# Patient Record
Sex: MALE | Race: WHITE
[De-identification: names, ages, dates, MRNs, and addresses within clinical notes are randomized per-mention and may not be internally consistent; named-entity substitution may affect disease eponyms.]

---

## 2019-10-04 ENCOUNTER — HOSPITAL ENCOUNTER (OUTPATIENT)
Dept: HOSPITAL 38 - CC.SDS | Age: 69
End: 2019-10-04
Attending: FAMILY MEDICINE
Payer: MEDICARE

## 2019-10-04 VITALS — DIASTOLIC BLOOD PRESSURE: 74 MMHG | HEART RATE: 66 BPM | SYSTOLIC BLOOD PRESSURE: 126 MMHG

## 2019-10-04 DIAGNOSIS — K64.8: ICD-10-CM

## 2019-10-04 DIAGNOSIS — Z80.0: ICD-10-CM

## 2019-10-04 DIAGNOSIS — Z79.899: ICD-10-CM

## 2019-10-04 DIAGNOSIS — Z79.82: ICD-10-CM

## 2019-10-04 DIAGNOSIS — K62.5: Primary | ICD-10-CM

## 2019-10-04 PROCEDURE — G0121 COLON CA SCRN NOT HI RSK IND: HCPCS

## 2019-10-04 PROCEDURE — 45380 COLONOSCOPY AND BIOPSY: CPT

## 2019-10-04 NOTE — OR
DATE OF OPERATION:  10/04/2019

 

PREOPERATIVE DIAGNOSIS:

1. FAMILY HISTORY OF COLON CANCER.

2. BRIGHT RED BLOOD PER RECTUM.

 

POSTOPERATIVE DIAGNOSIS:

1. FAMILY HISTORY OF COLON CANCER.

2. BRIGHT RED BLOOD PER RECTUM.

 

SURGEON:  Harpreet Ortiz MD

 

PROCEDURE:  DIAGNOSTIC COLONOSCOPY.

 

ANESTHESIA:  MAC.

 

COMPLICATIONS:  None.

 

SPECIMEN:  None.

 

FINDINGS:

1. Essentially normal full-length colonoscopy.

2. Internal hemorrhoids.

 

RECOMMENDATIONS:  Routine medical followup, colonoscopy every 5 years due to

family history of colon cancer in patient's father.

 

INDICATIONS:  The patient is approximately due for a colonoscopy due to his

family history, been having some constipation issues with occasional bright red

blood per rectum.  [facility] Fern[facility] Gonzalez sent him for diagnostic scope.

 

DESCRIPTION OF PROCEDURE:  The patient was prepped and draped, placed in the

left lateral decubitus position.  A lubricated Olympus colonoscope was inserted

and with ease advanced to the cecum.  I was able to directly visualize the

ileocecal valve and appendiceal orifice.  The bowel prep was adequate.  There

was an area in the sigmoid that was quite filled with stool.  I irrigated as

best as possible, but smaller lesions certainly might have been missed for about

15 cm.  Upon withdrawal throughout the right transverse and descending colon, no

abnormalities were seen.  The sigmoid area, other than the area that had stool

essentially was benign.  No obvious polyps, mass, ulceration, or bleeding sites.

No vascular abnormalities or signs of colitis.  The rectal vault was benign.

Retroflexion showed a lot of perianal hemorrhoid disease, otherwise benign.

There was a small skin

tag present as well.  Air was then suctioned from the colon, scope removed

without complication.

 

CHRYSTAL/CHINEDU

DD:  10/04/2019 10:01:16

DT:  10/04/2019 10:35:57

Job #:  [id number]